# Patient Record
Sex: MALE | Race: WHITE | Employment: FULL TIME | URBAN - METROPOLITAN AREA
[De-identification: names, ages, dates, MRNs, and addresses within clinical notes are randomized per-mention and may not be internally consistent; named-entity substitution may affect disease eponyms.]

---

## 2017-10-30 ENCOUNTER — OFFICE VISIT (OUTPATIENT)
Dept: FAMILY MEDICINE | Facility: CLINIC | Age: 32
End: 2017-10-30
Payer: COMMERCIAL

## 2017-10-30 VITALS
HEART RATE: 84 BPM | TEMPERATURE: 97.8 F | SYSTOLIC BLOOD PRESSURE: 115 MMHG | WEIGHT: 173.6 LBS | DIASTOLIC BLOOD PRESSURE: 71 MMHG

## 2017-10-30 DIAGNOSIS — Z30.09 ENCOUNTER FOR VASECTOMY COUNSELING: Primary | ICD-10-CM

## 2017-10-30 PROCEDURE — 99213 OFFICE O/P EST LOW 20 MIN: CPT | Performed by: FAMILY MEDICINE

## 2017-10-30 NOTE — PROGRESS NOTES
SUBJECTIVE:  The patient presents for discussion of vasectomy.  The procedure was explained in detail using diagram from the vasectomy pamphlet published by Brianna Salazar.  The permanency and effactiveness of this operation were explained in detail, including casectomy failure rate, bleeding, infection, scarring, chronic pain and epididymititis.  The patient was told to stay at bedrest for 48-72 hours, no heavy lifting for one week and to refrain from sex for 1 week.  The patient was also told to have a post operative sperm count at 3 months.  He should have another birth control until he has a sample that is infertile.  Approximately, 20 minutes were used in the discussion of the vasectomy.  Reviewed health maintenance  There is no problem list on file for this patient.      OBJECTIVE:  NAD  /71  Pulse 84  Temp 97.8  F (36.6  C) (Oral)  Wt 173 lb 9.6 oz (78.7 kg)  ASSESSMENT:  Vasectomy consult  PLAN:  Schedule vasectomy

## 2017-10-30 NOTE — MR AVS SNAPSHOT
"              After Visit Summary   10/30/2017    Skyler Jain    MRN: [id number]           Patient Information     Date Of Birth          1985        Visit Information        Provider Department      10/30/2017 1:30 PM Tomasz Pierre MD [facility]        Today's Diagnoses     Encounter for vasectomy counseling    -  1       Follow-ups after your visit        Who to contact     If you have questions or need follow up information about today's clinic visit or your schedule please contact [facility] directly at [phone].  Normal or non-critical lab and imaging results will be communicated to you by [employer], letter or phone within 4 business days after the clinic has received the results. If you do not hear from us within 7 days, please contact the clinic through [employer] or phone. If you have a critical or abnormal lab result, we will notify you by phone as soon as possible.  Submit refill requests through [employer] or call your pharmacy and they will forward the refill request to us. Please allow 3 business days for your refill to be completed.          Additional Information About Your Visit        MyChart Information     [employer] lets you send messages to your doctor, view your test results, renew your prescriptions, schedule appointments and more. To sign up, go to [email or web address] . Click on \"Log in\" on the left side of the screen, which will take you to the Welcome page. Then click on \"Sign up Now\" on the right side of the page.     You will be asked to enter the access code listed below, as well as some personal information. Please follow the directions to create your username and password.     Your access code is: [email or web address]  Expires: 2018  1:54 PM     Your access code will  in 90 days. If you need help or a new code, please call your [facility] or [phone].        Care EveryWhere ID     This is your Care EveryWhere ID. This could be used " by other organizations to access your [place] medical records  [id number]        Your Vitals Were     Pulse Temperature                84 97.8  F (36.6  C) (Oral)           Blood Pressure from Last 3 Encounters:   10/30/17 115/71   08/31/13 108/74    Weight from Last 3 Encounters:   10/30/17 173 lb 9.6 oz (78.7 kg)              Today, you had the following     No orders found for display       Primary Care Provider Office Phone # Fax #    Torin Obrien PA-C [phone] [address] [facility] [address]        Equal Access to Services     [facility]: Hadii aad ku hadasho Soomaali, waaxda luqadaha, qaybta kaalmada adeegyada, nia edwards adelubna shea . So [facility] [phone].    ATENCIÓN: Si habla español, tiene a bernal disposición servicios gratuitos de asistencia lingüística. Llame al [phone].    We comply with applicable federal civil rights laws and [place] laws. We do not discriminate on the basis of race, color, national origin, age, disability, sex, sexual orientation, or gender identity.            Thank you!     Thank you for choosing [facility]  for your care. Our goal is always to provide you with excellent care. Hearing back from our patients is one way we can continue to improve our services. Please take a few minutes to complete the written survey that you may receive in the mail after your visit with us. Thank you!             Your Updated Medication List - Protect others around you: Learn how to safely use, store and throw away your medicines at www.disposemymeds.org.      Notice  As of 10/30/2017  1:54 PM    You have not been prescribed any medications.

## 2017-10-30 NOTE — NURSING NOTE
Chief Complaint   Patient presents with     Consult     vasectomy       Initial /71  Pulse 84  Temp 97.8  F (36.6  C) (Oral)  Wt 173 lb 9.6 oz (78.7 kg) There is no height or weight on file to calculate BMI.  Medication Reconciliation: complete  Rubin Way, CMA

## 2017-12-14 ENCOUNTER — OFFICE VISIT (OUTPATIENT)
Dept: FAMILY MEDICINE | Facility: CLINIC | Age: 32
End: 2017-12-14
Payer: COMMERCIAL

## 2017-12-14 VITALS
TEMPERATURE: 98 F | WEIGHT: 173.8 LBS | DIASTOLIC BLOOD PRESSURE: 81 MMHG | HEART RATE: 77 BPM | SYSTOLIC BLOOD PRESSURE: 125 MMHG

## 2017-12-14 DIAGNOSIS — Z30.2 ENCOUNTER FOR VASECTOMY: Primary | ICD-10-CM

## 2017-12-14 PROCEDURE — 99207 ZZC NO CHARGE LOS: CPT | Performed by: FAMILY MEDICINE

## 2017-12-14 PROCEDURE — 88302 TISSUE EXAM BY PATHOLOGIST: CPT | Performed by: FAMILY MEDICINE

## 2017-12-14 PROCEDURE — 55250 REMOVAL OF SPERM DUCT(S): CPT | Performed by: FAMILY MEDICINE

## 2017-12-14 NOTE — MR AVS SNAPSHOT
"              After Visit Summary   2017    Skyler Jain    MRN: [id number]           Patient Information     Date Of Birth          1985        Visit Information        Provider Department      2017 2:50 PM Tomasz Pierre MD; [facility]        Today's Diagnoses     Encounter for vasectomy    -  1       Follow-ups after your visit        Who to contact     If you have questions or need follow up information about today's clinic visit or your schedule please contact [facility] directly at [phone].  Normal or non-critical lab and imaging results will be communicated to you by [employer], letter or phone within 4 business days after the clinic has received the results. If you do not hear from us within 7 days, please contact the clinic through [employer] or phone. If you have a critical or abnormal lab result, we will notify you by phone as soon as possible.  Submit refill requests through [employer] or call your pharmacy and they will forward the refill request to us. Please allow 3 business days for your refill to be completed.          Additional Information About Your Visit        MyChart Information     [employer] lets you send messages to your doctor, view your test results, renew your prescriptions, schedule appointments and more. To sign up, go to [email or web address] . Click on \"Log in\" on the left side of the screen, which will take you to the Welcome page. Then click on \"Sign up Now\" on the right side of the page.     You will be asked to enter the access code listed below, as well as some personal information. Please follow the directions to create your username and password.     Your access code is: [email or web address]  Expires: 2018 12:54 PM     Your access code will  in 90 days. If you need help or a new code, please call your [facility] or [phone].        Care EveryWhere ID     This is your Care EveryWhere ID. " This could be used by other organizations to access your [place] medical records  [id number]        Your Vitals Were     Pulse Temperature                77 98  F (36.7  C) (Oral)           Blood Pressure from Last 3 Encounters:   12/14/17 125/81   10/30/17 115/71   08/31/13 108/74    Weight from Last 3 Encounters:   12/14/17 173 lb 12.8 oz (78.8 kg)   10/30/17 173 lb 9.6 oz (78.7 kg)              We Performed the Following     Surgical pathology exam     VASECTOMY UNILAT/BILAT W POSTOP SEMEN        Primary Care Provider Office Phone # Fax #    Torin Richar Obrien, CODY [phone] [address] [facility] [address]        Equal Access to Services     SON[facility] CASTILL[facility] : Hadii casey banks hadasho Soomaali, waaxda luqadaha, qaybta kaalmada adeegyada, nia whitein grace shea . So [facility] [phone].    ATENCIÓN: Si habla español, tiene a bernal disposición servicios gratuitos de asistencia lingüística. Llame al [phone].    We comply with applicable federal civil rights laws and [place] laws. We do not discriminate on the basis of race, color, national origin, age, disability, sex, sexual orientation, or gender identity.            Thank you!     Thank you for choosing [facility]  for your care. Our goal is always to provide you with excellent care. Hearing back from our patients is one way we can continue to improve our services. Please take a few minutes to complete the written survey that you may receive in the mail after your visit with us. Thank you!             Your Updated Medication List - Protect others around you: Learn how to safely use, store and throw away your medicines at www.disposemymeds.org.      Notice  As of 12/14/2017  4:26 PM    You have not been prescribed any medications.

## 2017-12-14 NOTE — PROGRESS NOTES
SUBJECTIVE:  Informed consent was obtained.  An opportunity for questions was given, these were answered to his satisfaction.  Vasectomy literature was reviewed at his consult and post vas instruction sheets have also been reviewed.  He knows that it is meant to be a permanent procedure and that he needs to bring in a post vas specimen in eight to twelve weeks after twelve to fifteen ejaculations and have a negative semen analysis with no sperm seen before he can be considered sterile. He wishes to proceed      PROCEDURE:  The penis was taped up on to the abdomen. A shave was preformed. The scrotum was prepped with Betadine and draped in a sterile fashion.  Attention was turned to the right vas; this was palpated and the area infiltrated with 1% Lidocaine plain.after the vas had been pulled to midline.  A 1 1/2 cm. incision was made in the skin and blunt dissection carried out through the subcutaneous tissue.  The vas was grasped with the vas towel clamp and brought to the surface.  The светлана vas tissue was then dissected free.  The vas was then doubly clamped and a 3 mm. section excised.  The proximal vas waqs clamped x 2 and the distal end x 1.The vas was sent to Pathology for confirmation.  The ends were then cauterized.  Any bleeders were controlled with the pursestring suture and/or cautery. The ends were inspected and hemostasis was deemed to be adequate.  The two ends were then delivered into the scrotum.  Attention was then turned to the left vas and a similar procedure was carried out using the same initial incision.    Specimens sent of the right and left vas.     COMPLICATIONS:  none    PLAN:  He is going to take it easy over the next couple of days.  He will follow the instructions on his post vas instruction sheet.  Tomasz Pierre

## 2017-12-14 NOTE — LETTER
"                                                                          Affirmation of Informed Consent for Surgery or Invasive Procedure    1.  I, (print patient's name) Skyler Jain,  1985,   a.  Agree that I will have (include both the medical term and patient words):         No chief complaint on file.     b.  At [facility].     c.  The reason for this procedure is (medical condition):  Vasectomy.   d.  This will be done or supervised by:  Tomasz Pierre MD.   e.  My doctor may have help from others.  Help could include opening or closing         the wound. Help might also include taking grafts, cutting out tissue,                           implanting devices.  I have been told who will help, if known.     2.  I have talked to my doctor or health care team about:   a.  What the procedure is and what will happen.   b   How it may help me (the benefits).   c.  How it may harm me (the most likely and most serious risks).   d.  The long-term effects the procedure might have.    e.  My other choices for treatment.  The risks and benefits of those choices.    f.   What will likely happen if I say \"no\" to this procedure.    g.  How I might feel right after and how quickly I might be expected to recover.      h.  What medicines will be used to manage pain or sedate me.     3.  I agree that:  (If I do not agree with a statement, I have crossed it out and              initialed next to it.)       a.  I will ask questions.     b.  No one has promised me definite results.    c.  If serious problems are found during the procedure, the treatment may                    change.   d.  If I have \"do not resuscitate\" (DNR) wishes, I have discussed this with my                              doctor and they will be put on hold during the procedure.   e. Students and other appropriate people, approved by the facility, may watch                      the procedure and help with tasks they are qualified for.     "                                                f.  Pictures or videos may be taken. They may be used for medical or                  educational reasons only.       g. Tissues or organs removed from my body as part of the normal course of the                    procedure may be used for research or teaching purposes. They will be                  disposed of with respect.                    h.  If a staff person is exposed to my blood or body fluids, my blood will be drawn                   and tested for HIV and hepatitis.  I understand that by law, the test results will                    go:         -  In my medical record.                         -  To the Employee Occupational Health Service and/or Infection Control                                  at this facility.    -  To the [place] Health officials.            i.  I may have a blood transfusion: I have talked to my doctor or care team about:    -  Why I may need a blood transfusion.     - The risks, benefits, and side effects of transfusion - and the risks of not        Having one.     -  Blood safety and other options for treatment.        Consent for blood transfusion obtained during a hospital admission is valid for the entire hospital stay.  Consent  for blood transfusion obtained in the clinic setting is valid for a year from the signature date.                                4.  I understand that:   a.  I can change my mind.  If I do, I must tell my doctor or team as soon as                           possible.              b.  The team members may change during the procedure.                c.   The team will double-check who I am.  They will ask me what I am having                         done and the site of the site of the procedure.  This is done for my safety.    My questions have been answered.  I agree to the procedure.  I have made my special needs and instructions known.      Skyler Jain      12/14/2017 3:04 PM  Patient (or  representative)/Relationship to patient             Date  Time    Witness:  I have verified that the signature is that of the patient or patient's representative and that this has been signed before the procedure:    NAME:        12/14/2017  3:04 PM         Date  Time  Person verifying patient's name or patient representative's signature     Provider:  I have discussed the procedure and the information stated above with the patient (or the patient's representative) and answered their questions. The patient or their representative consented to the procedure:      Tomasz Pierre MD    12/14/2017  3:04 PM  Physician or Provider's Signature(s)   Date  Time       Intepreter (if used):       12/14/2017  3:04 PM                                   Name       Language/Organization Date  Time    Consent for procedure valid for 30 days after patient signature date     [facility]  AFFIRMATION OF INFORMED CONSENT FOR SURGERY OR INVASIVE PROCEDURE               Original - Medical Records

## 2017-12-14 NOTE — NURSING NOTE
Chief Complaint   Patient presents with     Vasectomy       Initial /81  Pulse 77  Temp 98  F (36.7  C) (Oral)  Wt 173 lb 12.8 oz (78.8 kg) There is no height or weight on file to calculate BMI.  Medication Reconciliation: complete  Rubin Way, CMA

## 2017-12-18 LAB — COPATH REPORT: NORMAL

## 2017-12-20 ENCOUNTER — OFFICE VISIT (OUTPATIENT)
Dept: FAMILY MEDICINE | Facility: CLINIC | Age: 32
End: 2017-12-20
Payer: COMMERCIAL

## 2017-12-20 ENCOUNTER — TELEPHONE (OUTPATIENT)
Dept: FAMILY MEDICINE | Facility: CLINIC | Age: 32
End: 2017-12-20

## 2017-12-20 VITALS
DIASTOLIC BLOOD PRESSURE: 84 MMHG | TEMPERATURE: 98.4 F | SYSTOLIC BLOOD PRESSURE: 125 MMHG | HEART RATE: 84 BPM | WEIGHT: 174.4 LBS

## 2017-12-20 DIAGNOSIS — Z30.2 ENCOUNTER FOR VASECTOMY: Primary | ICD-10-CM

## 2017-12-20 PROCEDURE — 99207 ZZC NO CHARGE LOS: CPT | Performed by: FAMILY MEDICINE

## 2017-12-20 NOTE — TELEPHONE ENCOUNTER
Patient calling, he had a vasectomy last Thursday and is experiencing more pain than he thinks he should be. Would like a call back to discuss.

## 2017-12-20 NOTE — TELEPHONE ENCOUNTER
"Per patient:  Had Vasectomy last Thursday.  He states since Sunday:  No swelling/redness but states is having \"extreme discomfort\" Monday and today. States feels like he got a good kick; really uncomfortably.  Left side more tender than right.  States lower pubic area feels really tight.  Sitting is worse than standing or lying down. He states that he iced regularly rest of Thurs/Fri/Sat/Sun/Mon.  Didn't do much of anything first 3 days.  Still icing twice a day; back at work Tuesday.  Has been wearing supportive underwear.  Has done no lifting.  No physical exercise;  Has a desk job; is a .  Has no fever, chills, sweats.  Has been using ibuprofen 800mg bid but doesn't seem to relieve the discomfort.  Wanting advisement as to what he is feeling.  Will discuss with Dr. Tomasz Pierre and call him back.  Fabiana Freedman RN    "

## 2017-12-20 NOTE — NURSING NOTE
Chief Complaint   Patient presents with     RECHECK     follow up after vasectomy       Initial /84  Pulse 84  Temp 98.4  F (36.9  C) (Oral)  Wt 174 lb 6.4 oz (79.1 kg) There is no height or weight on file to calculate BMI.  Medication Reconciliation: complete  Rubin Way, CMA

## 2017-12-20 NOTE — PROGRESS NOTES
SUBJECTIVE:  32 year old.The patient has a history of left sided landy in scrotum. .  This started 4 days. ago. Location at vas site on the left side.  6 days from vasectomy and 2 days did well  quality pulling  Associated symptoms are some swelling.  Worse with sitting .  . ROS no fever or chills      Reviewed health maintenance  There is no problem list on file for this patient.    History reviewed. No pertinent past medical history.    OBJECTIVE:  no apparent distress  /84  Pulse 84  Temp 98.4  F (36.9  C) (Oral)  Wt 174 lb 6.4 oz (79.1 kg)    Tender left scrotum at afferent stump no ecchymoses or .erythema       ICD-10-CM    1. Encounter for vasectomy Z30.2     PLAN: sitting or standing no walking of distance and ice

## 2017-12-20 NOTE — MR AVS SNAPSHOT
"              After Visit Summary   2017    Skyler Jain    MRN: [id number]           Patient Information     Date Of Birth          1985        Visit Information        Provider Department      2017 3:00 PM Tomasz Pierre MD [facility]        Today's Diagnoses     Encounter for vasectomy    -  1       Follow-ups after your visit        Who to contact     If you have questions or need follow up information about today's clinic visit or your schedule please contact [facility] [place] [facility] directly at [phone].  Normal or non-critical lab and imaging results will be communicated to you by [employer], letter or phone within 4 business days after the clinic has received the results. If you do not hear from us within 7 days, please contact the clinic through [employer] or phone. If you have a critical or abnormal lab result, we will notify you by phone as soon as possible.  Submit refill requests through [employer] or call your pharmacy and they will forward the refill request to us. Please allow 3 business days for your refill to be completed.          Additional Information About Your Visit        MyChart Information     [employer] lets you send messages to your doctor, view your test results, renew your prescriptions, schedule appointments and more. To sign up, go to [email or web address] . Click on \"Log in\" on the left side of the screen, which will take you to the Welcome page. Then click on \"Sign up Now\" on the right side of the page.     You will be asked to enter the access code listed below, as well as some personal information. Please follow the directions to create your username and password.     Your access code is: [email or web address]  Expires: 2018 12:54 PM     Your access code will  in 90 days. If you need help or a new code, please call your [facility] or [phone].        Care EveryWhere ID     This is your Care EveryWhere ID. This could be used by other " organizations to access your [place] medical records  [id number]        Your Vitals Were     Pulse Temperature                84 98.4  F (36.9  C) (Oral)           Blood Pressure from Last 3 Encounters:   12/20/17 125/84   12/14/17 125/81   10/30/17 115/71    Weight from Last 3 Encounters:   12/20/17 174 lb 6.4 oz (79.1 kg)   12/14/17 173 lb 12.8 oz (78.8 kg)   10/30/17 173 lb 9.6 oz (78.7 kg)              Today, you had the following     No orders found for display       Primary Care Provider Office Phone # Fax #    Torin Obrien PA-C [phone] [address] [facility] [address]        Equal Access to Services     SON[facility] CASTILLO : Hadii aad ku hadasho Soomaali, waaxda luqadaha, qaybta kaalmada adeegyada, nia shea . So [facility] [phone].    ATENCIÓN: Si habla español, tiene a bernal disposición servicios gratuitos de asistencia lingüística. Llame al [phone].    We comply with applicable federal civil rights laws and [place] laws. We do not discriminate on the basis of race, color, national origin, age, disability, sex, sexual orientation, or gender identity.            Thank you!     Thank you for choosing [facility]  for your care. Our goal is always to provide you with excellent care. Hearing back from our patients is one way we can continue to improve our services. Please take a few minutes to complete the written survey that you may receive in the mail after your visit with us. Thank you!             Your Updated Medication List - Protect others around you: Learn how to safely use, store and throw away your medicines at www.disposemymeds.org.          This list is accurate as of: 12/20/17  3:28 PM.  Always use your most recent med list.                   Brand Name Dispense Instructions for use Diagnosis    IBUPROFEN PO      Take 200 mg by mouth